# Patient Record
Sex: FEMALE | Race: WHITE | NOT HISPANIC OR LATINO | URBAN - METROPOLITAN AREA
[De-identification: names, ages, dates, MRNs, and addresses within clinical notes are randomized per-mention and may not be internally consistent; named-entity substitution may affect disease eponyms.]

---

## 2017-08-19 ENCOUNTER — EMERGENCY (EMERGENCY)
Facility: HOSPITAL | Age: 47
LOS: 1 days | Discharge: HOME | End: 2017-08-19
Attending: EMERGENCY MEDICINE | Admitting: EMERGENCY MEDICINE
Payer: COMMERCIAL

## 2017-08-19 VITALS
HEIGHT: 64 IN | SYSTOLIC BLOOD PRESSURE: 114 MMHG | WEIGHT: 130.07 LBS | OXYGEN SATURATION: 99 % | HEART RATE: 67 BPM | RESPIRATION RATE: 20 BRPM | DIASTOLIC BLOOD PRESSURE: 64 MMHG | TEMPERATURE: 98 F

## 2017-08-19 DIAGNOSIS — S01.81XA LACERATION WITHOUT FOREIGN BODY OF OTHER PART OF HEAD, INITIAL ENCOUNTER: ICD-10-CM

## 2017-08-19 DIAGNOSIS — Z23 ENCOUNTER FOR IMMUNIZATION: ICD-10-CM

## 2017-08-19 DIAGNOSIS — Z79.899 OTHER LONG TERM (CURRENT) DRUG THERAPY: ICD-10-CM
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
## 2017-08-19 PROCEDURE — 90471 IMMUNIZATION ADMIN: CPT

## 2017-08-19 PROCEDURE — 90715 TDAP VACCINE 7 YRS/> IM: CPT

## 2017-08-19 PROCEDURE — 12011 RPR F/E/E/N/L/M 2.5 CM/<: CPT

## 2017-08-19 PROCEDURE — 99283 EMERGENCY DEPT VISIT LOW MDM: CPT | Mod: 25

## 2017-08-19 RX ORDER — TETANUS TOXOID, REDUCED DIPHTHERIA TOXOID AND ACELLULAR PERTUSSIS VACCINE, ADSORBED 5; 2.5; 8; 8; 2.5 [IU]/.5ML; [IU]/.5ML; UG/.5ML; UG/.5ML; UG/.5ML
0.5 SUSPENSION INTRAMUSCULAR ONCE
Qty: 0 | Refills: 0 | Status: COMPLETED | OUTPATIENT
Start: 2017-08-19 | End: 2017-08-19

## 2017-08-19 RX ADMIN — TETANUS TOXOID, REDUCED DIPHTHERIA TOXOID AND ACELLULAR PERTUSSIS VACCINE, ADSORBED 0.5 MILLILITER(S): 5; 2.5; 8; 8; 2.5 SUSPENSION INTRAMUSCULAR at 19:02

## 2017-08-19 NOTE — ED ADULT NURSE NOTE - CHPI ED SYMPTOMS NEG
no purulent drainage/no redness/no bleeding/no bleeding at site/no fever/no red streaks/no drainage/no chills/no vomiting

## 2017-08-19 NOTE — ED PROVIDER NOTE - MEDICAL DECISION MAKING DETAILS
here w/ forehead laceration, mild. glued shut. tdap updated. no concern for more serious head injury. DC home in NAD with strict return precautions given.

## 2017-11-02 RX ORDER — ACETAZOLAMIDE 250 MG/1
0 TABLET ORAL
Qty: 0 | Refills: 0 | COMMUNITY

## 2017-11-02 RX ORDER — DIAZEPAM 5 MG
0 TABLET ORAL
Qty: 0 | Refills: 0 | COMMUNITY

## 2018-11-02 NOTE — ED ADULT NURSE NOTE - OBJECTIVE STATEMENT
pt states she was trying to get something from high up and a can fell on her head. Pt denies LOC and blood thinners.
No

## 2021-03-06 ENCOUNTER — EMERGENCY (EMERGENCY)
Facility: HOSPITAL | Age: 51
LOS: 1 days | Discharge: HOME | End: 2021-03-06
Admitting: EMERGENCY MEDICINE
Payer: COMMERCIAL

## 2021-03-06 VITALS
HEIGHT: 64 IN | HEART RATE: 80 BPM | SYSTOLIC BLOOD PRESSURE: 124 MMHG | TEMPERATURE: 98 F | DIASTOLIC BLOOD PRESSURE: 85 MMHG | RESPIRATION RATE: 16 BRPM | OXYGEN SATURATION: 98 %

## 2021-03-06 DIAGNOSIS — Z20.822 CONTACT WITH AND (SUSPECTED) EXPOSURE TO COVID-19: ICD-10-CM

## 2021-03-06 DIAGNOSIS — R51.9 HEADACHE, UNSPECIFIED: ICD-10-CM

## 2021-03-06 PROBLEM — H81.09 MENIERE'S DISEASE, UNSPECIFIED EAR: Chronic | Status: ACTIVE | Noted: 2017-08-19

## 2021-03-06 LAB — SARS-COV-2 RNA SPEC QL NAA+PROBE: SIGNIFICANT CHANGE UP

## 2021-03-06 PROCEDURE — 99283 EMERGENCY DEPT VISIT LOW MDM: CPT

## 2021-03-06 PROCEDURE — U0005: CPT

## 2021-03-06 PROCEDURE — 99284 EMERGENCY DEPT VISIT MOD MDM: CPT

## 2021-03-06 PROCEDURE — U0003: CPT

## 2021-03-06 NOTE — ED PROVIDER NOTE - PATIENT PORTAL LINK FT
You can access the FollowMyHealth Patient Portal offered by [facility] by registering at the following website: [email or web address]. By joining [employer]’s FollowMyHealth portal, you will also be able to view your health information using other applications (apps) compatible with our system.

## 2021-03-06 NOTE — ED PROVIDER NOTE - OBJECTIVE STATEMENT
Patient is presenting to the Emergency Department with a request to have COVID-19 testing. Pt reports headache x2d.  Denies symptoms, including cough, shortness of breath, fever, chills, bodyaches or sore throat.

## 2021-03-06 NOTE — ED PROVIDER NOTE - CARE PLAN
Principal Discharge DX:	Headache  Secondary Diagnosis:	Encounter for medical screening examination

## 2023-06-22 ENCOUNTER — APPOINTMENT (OUTPATIENT)
Dept: OTOLARYNGOLOGY | Facility: CLINIC | Age: 53
End: 2023-06-22

## 2024-04-29 ENCOUNTER — NON-APPOINTMENT (OUTPATIENT)
Age: 54
End: 2024-04-29

## 2024-04-29 ENCOUNTER — EMERGENCY (EMERGENCY)
Facility: HOSPITAL | Age: 54
LOS: 1 days | Discharge: HOME | End: 2024-04-29
Attending: EMERGENCY MEDICINE | Admitting: EMERGENCY MEDICINE
Payer: COMMERCIAL

## 2024-04-29 ENCOUNTER — APPOINTMENT (OUTPATIENT)
Dept: UROLOGY | Facility: CLINIC | Age: 54
End: 2024-04-29
Payer: COMMERCIAL

## 2024-04-29 VITALS
HEART RATE: 83 BPM | SYSTOLIC BLOOD PRESSURE: 107 MMHG | TEMPERATURE: 98 F | RESPIRATION RATE: 18 BRPM | DIASTOLIC BLOOD PRESSURE: 70 MMHG | OXYGEN SATURATION: 95 %

## 2024-04-29 VITALS
TEMPERATURE: 98 F | OXYGEN SATURATION: 97 % | RESPIRATION RATE: 18 BRPM | DIASTOLIC BLOOD PRESSURE: 72 MMHG | HEART RATE: 73 BPM | SYSTOLIC BLOOD PRESSURE: 116 MMHG

## 2024-04-29 VITALS
OXYGEN SATURATION: 96 % | BODY MASS INDEX: 21.68 KG/M2 | DIASTOLIC BLOOD PRESSURE: 80 MMHG | HEART RATE: 80 BPM | WEIGHT: 127 LBS | TEMPERATURE: 98.6 F | HEIGHT: 64 IN | SYSTOLIC BLOOD PRESSURE: 118 MMHG

## 2024-04-29 DIAGNOSIS — R10.9 UNSPECIFIED ABDOMINAL PAIN: ICD-10-CM

## 2024-04-29 LAB
ALBUMIN SERPL ELPH-MCNC: 4.5 G/DL — SIGNIFICANT CHANGE UP (ref 3.3–5)
ALP SERPL-CCNC: 70 U/L — SIGNIFICANT CHANGE UP (ref 40–120)
ALT FLD-CCNC: 34 U/L — SIGNIFICANT CHANGE UP (ref 10–45)
ANION GAP SERPL CALC-SCNC: 13 MMOL/L — SIGNIFICANT CHANGE UP (ref 5–17)
APPEARANCE UR: CLEAR — SIGNIFICANT CHANGE UP
AST SERPL-CCNC: 51 U/L — HIGH (ref 10–40)
BACTERIA # UR AUTO: NEGATIVE /HPF — SIGNIFICANT CHANGE UP
BASOPHILS # BLD AUTO: 0.03 K/UL — SIGNIFICANT CHANGE UP (ref 0–0.2)
BASOPHILS NFR BLD AUTO: 0.3 % — SIGNIFICANT CHANGE UP (ref 0–2)
BILIRUB SERPL-MCNC: 0.5 MG/DL — SIGNIFICANT CHANGE UP (ref 0.2–1.2)
BILIRUB UR-MCNC: NEGATIVE — SIGNIFICANT CHANGE UP
BUN SERPL-MCNC: 10 MG/DL — SIGNIFICANT CHANGE UP (ref 7–23)
CALCIUM SERPL-MCNC: 9.3 MG/DL — SIGNIFICANT CHANGE UP (ref 8.4–10.5)
CAST: 0 /LPF — SIGNIFICANT CHANGE UP (ref 0–4)
CHLORIDE SERPL-SCNC: 106 MMOL/L — SIGNIFICANT CHANGE UP (ref 96–108)
CO2 SERPL-SCNC: 24 MMOL/L — SIGNIFICANT CHANGE UP (ref 22–31)
COLOR SPEC: SIGNIFICANT CHANGE UP
CREAT SERPL-MCNC: 0.84 MG/DL — SIGNIFICANT CHANGE UP (ref 0.5–1.3)
DIFF PNL FLD: ABNORMAL
EGFR: 83 ML/MIN/1.73M2 — SIGNIFICANT CHANGE UP
EOSINOPHIL # BLD AUTO: 0.06 K/UL — SIGNIFICANT CHANGE UP (ref 0–0.5)
EOSINOPHIL NFR BLD AUTO: 0.6 % — SIGNIFICANT CHANGE UP (ref 0–6)
GLUCOSE SERPL-MCNC: 96 MG/DL — SIGNIFICANT CHANGE UP (ref 70–99)
GLUCOSE UR QL: NEGATIVE MG/DL — SIGNIFICANT CHANGE UP
HCT VFR BLD CALC: 40.7 % — SIGNIFICANT CHANGE UP (ref 34.5–45)
HGB BLD-MCNC: 13.8 G/DL — SIGNIFICANT CHANGE UP (ref 11.5–15.5)
IMM GRANULOCYTES NFR BLD AUTO: 0.2 % — SIGNIFICANT CHANGE UP (ref 0–0.9)
KETONES UR-MCNC: NEGATIVE MG/DL — SIGNIFICANT CHANGE UP
LEUKOCYTE ESTERASE UR-ACNC: NEGATIVE — SIGNIFICANT CHANGE UP
LYMPHOCYTES # BLD AUTO: 1.48 K/UL — SIGNIFICANT CHANGE UP (ref 1–3.3)
LYMPHOCYTES # BLD AUTO: 16 % — SIGNIFICANT CHANGE UP (ref 13–44)
MCHC RBC-ENTMCNC: 30.3 PG — SIGNIFICANT CHANGE UP (ref 27–34)
MCHC RBC-ENTMCNC: 33.9 GM/DL — SIGNIFICANT CHANGE UP (ref 32–36)
MCV RBC AUTO: 89.5 FL — SIGNIFICANT CHANGE UP (ref 80–100)
MONOCYTES # BLD AUTO: 0.64 K/UL — SIGNIFICANT CHANGE UP (ref 0–0.9)
MONOCYTES NFR BLD AUTO: 6.9 % — SIGNIFICANT CHANGE UP (ref 2–14)
NEUTROPHILS # BLD AUTO: 7.02 K/UL — SIGNIFICANT CHANGE UP (ref 1.8–7.4)
NEUTROPHILS NFR BLD AUTO: 76 % — SIGNIFICANT CHANGE UP (ref 43–77)
NITRITE UR-MCNC: POSITIVE
NRBC # BLD: 0 /100 WBCS — SIGNIFICANT CHANGE UP (ref 0–0)
PH UR: 6.5 — SIGNIFICANT CHANGE UP (ref 5–8)
PLATELET # BLD AUTO: 249 K/UL — SIGNIFICANT CHANGE UP (ref 150–400)
POTASSIUM SERPL-MCNC: 3.5 MMOL/L — SIGNIFICANT CHANGE UP (ref 3.5–5.3)
POTASSIUM SERPL-SCNC: 3.5 MMOL/L — SIGNIFICANT CHANGE UP (ref 3.5–5.3)
PROT SERPL-MCNC: 6.9 G/DL — SIGNIFICANT CHANGE UP (ref 6–8.3)
PROT UR-MCNC: NEGATIVE MG/DL — SIGNIFICANT CHANGE UP
RBC # BLD: 4.55 M/UL — SIGNIFICANT CHANGE UP (ref 3.8–5.2)
RBC # FLD: 12.7 % — SIGNIFICANT CHANGE UP (ref 10.3–14.5)
RBC CASTS # UR COMP ASSIST: 0 /HPF — SIGNIFICANT CHANGE UP (ref 0–4)
SODIUM SERPL-SCNC: 143 MMOL/L — SIGNIFICANT CHANGE UP (ref 135–145)
SP GR SPEC: <1.005 — LOW (ref 1–1.03)
SQUAMOUS # UR AUTO: 2 /HPF — SIGNIFICANT CHANGE UP (ref 0–5)
UROBILINOGEN FLD QL: 1 MG/DL — SIGNIFICANT CHANGE UP (ref 0.2–1)
WBC # BLD: 9.25 K/UL — SIGNIFICANT CHANGE UP (ref 3.8–10.5)
WBC # FLD AUTO: 9.25 K/UL — SIGNIFICANT CHANGE UP (ref 3.8–10.5)
WBC UR QL: 1 /HPF — SIGNIFICANT CHANGE UP (ref 0–5)

## 2024-04-29 PROCEDURE — 36415 COLL VENOUS BLD VENIPUNCTURE: CPT

## 2024-04-29 PROCEDURE — 99284 EMERGENCY DEPT VISIT MOD MDM: CPT

## 2024-04-29 PROCEDURE — 99203 OFFICE O/P NEW LOW 30 MIN: CPT

## 2024-04-29 PROCEDURE — 74176 CT ABD & PELVIS W/O CONTRAST: CPT | Mod: 26,MC

## 2024-04-29 PROCEDURE — 81001 URINALYSIS AUTO W/SCOPE: CPT

## 2024-04-29 PROCEDURE — 96374 THER/PROPH/DIAG INJ IV PUSH: CPT

## 2024-04-29 PROCEDURE — 85025 COMPLETE CBC W/AUTO DIFF WBC: CPT

## 2024-04-29 PROCEDURE — 80053 COMPREHEN METABOLIC PANEL: CPT

## 2024-04-29 PROCEDURE — 74176 CT ABD & PELVIS W/O CONTRAST: CPT | Mod: MC

## 2024-04-29 PROCEDURE — 99284 EMERGENCY DEPT VISIT MOD MDM: CPT | Mod: 25

## 2024-04-29 RX ORDER — SODIUM CHLORIDE 9 MG/ML
1000 INJECTION INTRAMUSCULAR; INTRAVENOUS; SUBCUTANEOUS ONCE
Refills: 0 | Status: COMPLETED | OUTPATIENT
Start: 2024-04-29 | End: 2024-04-29

## 2024-04-29 RX ORDER — MAGNESIUM OXIDE/MAG AA CHELATE 300 MG
CAPSULE ORAL
Refills: 0 | Status: ACTIVE | COMMUNITY

## 2024-04-29 RX ORDER — OXYCODONE AND ACETAMINOPHEN 5; 325 MG/1; MG/1
1 TABLET ORAL
Qty: 12 | Refills: 0
Start: 2024-04-29

## 2024-04-29 RX ORDER — MORPHINE SULFATE 50 MG/1
4 CAPSULE, EXTENDED RELEASE ORAL ONCE
Refills: 0 | Status: DISCONTINUED | OUTPATIENT
Start: 2024-04-29 | End: 2024-04-29

## 2024-04-29 RX ADMIN — MORPHINE SULFATE 4 MILLIGRAM(S): 50 CAPSULE, EXTENDED RELEASE ORAL at 14:07

## 2024-04-29 RX ADMIN — SODIUM CHLORIDE 1000 MILLILITER(S): 9 INJECTION INTRAMUSCULAR; INTRAVENOUS; SUBCUTANEOUS at 13:37

## 2024-04-29 RX ADMIN — MORPHINE SULFATE 4 MILLIGRAM(S): 50 CAPSULE, EXTENDED RELEASE ORAL at 13:37

## 2024-04-29 NOTE — ASSESSMENT
[FreeTextEntry1] : Assessment:   DOTTY EVERETT is a 53 year old female with severe left flank pain and microscopic hematuria, suspicious for passing a kidney stone.  We discussed recent blood work and ultrasounds, which do not confirm she is passing a stone, and indication for further evaluation with CT scan to either confirm an obstructing stone or identify other causes of pain.  If an obstructing ureteral stone is noted, I discussed the management of urolithiasis with the patient including watchful waiting +/- medical expulsive therapy, shock wave lithotripsy, and ureteroscopy with laser lithotripsy. We also discussed possibility of placing a ureteral stent to help with pain management. Reviewed potential stent related symptoms.  Given her poorly controlled pain and motivation to have an accurate diagnosis and possible intervention as soon as possible, I directed her to the [facility] ED for further evaluation and possible intervention.     Plan:   -Proceed to [facility] ED

## 2024-04-29 NOTE — ED ADULT NURSE NOTE - OBJECTIVE STATEMENT
Pt is a 54y/o F presenting to the ED w/ c/o of bilat/lower pelvic pain, abd tenderness/abd distention, radiating to flank bilaterally, since Friday, seen @ [facility], refused CT scan d/t radiation, bilat kidney stones seen on US, seen @ [facility] urology today, sent to ED for pain control/CT scan. Pt denies PMHx, last took Aleve for pain today @11am w/ no relief. Pt denies CP, SOB, fever/chills, N/V/D, HA, blurry vision, dizziness/lightheadedness, numbness/tingling, nasal congestion, cough, sore throat, urinary symptoms, recent falls @ home. Pt taking Azo @ home for UTI symptoms, but denies urinary frequency, dysuria, hematuria, vaginal discharge. Pt A/Ox3, speaking in clear/complete sentences. Respirations easy/even and unlabored on RA. Pt ambulates independently w/ steady gait. Pt w/ c/o of worsening pain, anxious appearing.

## 2024-04-29 NOTE — ED PROVIDER NOTE - CONSTITUTIONAL, MLM
Well appearing, awake, alert, oriented to person, place, time/situation and appears uncomfortable normal...

## 2024-04-29 NOTE — ED ADULT NURSE NOTE - CHIEF COMPLAINT QUOTE
Pt presents to ED C/O L flank pain starting Friday AM seen at [place] on Sat. Pt states, " I had two US they said I have a kidney stone in both sides but the L is hurting, they said it should be passable but wanted a CT to see if it's moving, I just came from urology upstairs." Denies PMH. Pt took Aleve PTA. Pt has US report at bedside.

## 2024-04-29 NOTE — ED PROVIDER NOTE - CLINICAL SUMMARY MEDICAL DECISION MAKING FREE TEXT BOX
flank pain on left r/o obstructing stone. already on aleve/keflex. labs sent. given morphine iv. ct ordered. flank pain on left r/o obstructing stone. already on aleve/keflex. labs sent. given morphine iv. ct ordered.  ct with punctate uvj stone, mild hydro. renal function normal. ua w nitrite (already on keflex).  pain controlled after morphine. will dc w percocet. continue current meds. f/u urology. return precautions discussed

## 2024-04-29 NOTE — ED ADULT TRIAGE NOTE - CHIEF COMPLAINT QUOTE
Pt presents to ED C/O L flank pain starting Friday AM seen at [facility] on Sat. Pt states, " I had two US they said I have a kidney stone in both sides but the L is hurting, they said it should be passable but wanted a CT to see if it's moving, I just came from urology upstairs." Denies PMH. Pt took Aleve PTA. Pt has US report at bedside.

## 2024-04-29 NOTE — HISTORY OF PRESENT ILLNESS
[FreeTextEntry1] : Name DOTTY EVERETT MRN [id number]  1970 ------------------------------------------------------------------------------------------------------------------------------------------- Date of First Visit:  2024 Referring Provider/PCP: Dr. Diogo Mcclure   ------------------------------------------------------------------------------------------------------------------------------------------- CC: left flank pain, possible kidney stone  History of Present Illness: DOTTY EVERETT is a 53-year-old female with no significant PMH who presents for evaluation of left flank pain and possible kidney stone. Started experiencing left flank and abdominal pain on Friday night, which persisted to Saturday morning. Pain became severe so she presented to an outside ER. Renal US (report only) showed right upper pole stone measuring 9mm, left upper pole stone measuring 6mm, no hydronephrosis.  Pelvis US showed a fibroid uterus. UA showed numerous RBCs, negative nitrites and leukocytes. Creatinine 0.77, WBC 6.3. She was discharged with Keflex, Pyridium, Flomax and was presumed to be passing a kidney stone. She presents today to establish care. Has been experiencing persistent left flank and abdominal pain, poorly controlled with Advil and Aleve. Some associated nausea.  Denies fever, chills, vomiting, urinary complaints.     Renal US (report only) scanned into chart: Right upper pole stone measuring 9mm, left upper pole stone measuring 6mm, no hydronephrosis.   Previous urine cultures: None    Kidney Stone History:  First-time stone former - Yes Concurrent asymptomatic stone(s) - Yes Previous stone surgeries - No Previous passed stones - No  Comorbidities - non-contributory Family history of kidney stones - No   Previous metabolic evaluation - No

## 2024-04-29 NOTE — ED PROVIDER NOTE - NSFOLLOWUPINSTRUCTIONS_ED_ALL_ED_FT
Continue your previously prescribed medications along with ibuprofen or aleve as directed for pain. For severe pain, take percocet in addition. Please see your urologist for followup.  Call for appointment.  If you have any problems with followup, please call the ED Referral Coordinator at [phone].  Return to the ER if symptoms worsen or other concerns.    Do not take narcotics (percocet) and drive or operate machinery      Kidney Stones    WHAT YOU NEED TO KNOW:    Kidney stones form in the urinary system when the water and waste in your urine are out of balance. When this happens, certain types of waste crystals separate from the urine. The crystals build up and form kidney stones. You may have more than one kidney stone.     Kidney Stones          DISCHARGE INSTRUCTIONS:    Return to the emergency department if:   •You have vomiting that is not relieved by medicine.          Contact your healthcare provider if:   •You have a fever.       •You have trouble passing urine.      •You see blood in your urine.      •You have severe pain.      •You have any questions or concerns about your condition or care.      Medicines:   •NSAIDs, such as ibuprofen, help decrease swelling, pain, and fever. This medicine is available with or without a doctor's order. NSAIDs can cause stomach bleeding or kidney problems in certain people. If you take blood thinner medicine, always ask your healthcare provider if NSAIDs are safe for you. Always read the medicine label and follow directions.      •Prescription pain medicine may be given. Ask your healthcare provider how to take this medicine safely. Some prescription pain medicines contain acetaminophen. Do not take other medicines that contain acetaminophen without talking to your healthcare provider. Too much acetaminophen may cause liver damage. Prescription pain medicine may cause constipation. Ask your healthcare provider how to prevent or treat constipation.       •Medicines to balance your electrolytes may be needed.       •Take your medicine as directed. Contact your healthcare provider if you think your medicine is not helping or if you have side effects. Tell him or her if you are allergic to any medicine. Keep a list of the medicines, vitamins, and herbs you take. Include the amounts, and when and why you take them. Bring the list or the pill bottles to follow-up visits. Carry your medicine list with you in case of an emergency.      Follow up with your healthcare provider as directed: You may need to return for more tests. Write down your questions so you remember to ask them during your visits.    What you can do to manage kidney stones:   •Drink more liquids. Your healthcare provider may tell you to drink at least 8 to 12 (eight-ounce) cups of liquids each day. This helps flush out the kidney stones when you urinate. Water is the best liquid to drink.      •Strain your urine every time you go to the bathroom. Urinate through a strainer or a piece of thin cloth to catch the stones. Take the stones to your healthcare provider so they can be sent to the lab for tests. This will help your healthcare providers plan the best treatment for you.  Look for Stones in the Filter           •Eat a variety of healthy foods. Healthy foods include fruits, vegetables, whole-grain breads, low-fat dairy products, beans, and fish. You may need to limit how much sodium (salt) or protein you eat. Ask for information about the best foods for you.      •Stay active. Your stones may pass more easily if you stay active. Exercise can also help you manage your weight. Ask about the best activities for you.      After you pass the kidney stones: Your healthcare provider may order a 24-hour urine test. Results from a 24-hour urine test will help your healthcare provider plan ways to prevent more stones from forming. Your healthcare provider will give you more instructions.

## 2024-04-29 NOTE — ED PROVIDER NOTE - CARE PROVIDER_API CALL
Dax Santiago.  Urology  [address]  [place] [address]  Phone: [phone]  Fax: [phone]  Follow Up Time:

## 2024-04-29 NOTE — ED PROVIDER NOTE - OBJECTIVE STATEMENT
history of kidney stones, here with left flank pain since Friday. Went to another ER, had US done, told non obstructing stones bilaterally. Prescribed keflex, phenazopyridine, flomax, which she has been taking along with aleve/ibuprofen. Today pain worsened. Went to [facility] and referred to ED for ct. Denies vomiting, fever, hematuria.

## 2024-05-02 DIAGNOSIS — Z87.442 PERSONAL HISTORY OF URINARY CALCULI: ICD-10-CM

## 2024-05-02 DIAGNOSIS — N13.2 HYDRONEPHROSIS WITH RENAL AND URETERAL CALCULOUS OBSTRUCTION: ICD-10-CM

## 2024-05-02 DIAGNOSIS — R10.9 UNSPECIFIED ABDOMINAL PAIN: ICD-10-CM

## 2024-05-08 ENCOUNTER — APPOINTMENT (OUTPATIENT)
Dept: UROLOGY | Facility: CLINIC | Age: 54
End: 2024-05-08

## 2024-06-06 ENCOUNTER — APPOINTMENT (OUTPATIENT)
Dept: UROLOGY | Facility: CLINIC | Age: 54
End: 2024-06-06
Payer: COMMERCIAL

## 2024-06-06 PROCEDURE — 76770 US EXAM ABDO BACK WALL COMP: CPT

## 2024-06-06 PROCEDURE — 99213 OFFICE O/P EST LOW 20 MIN: CPT

## 2024-06-07 NOTE — HISTORY OF PRESENT ILLNESS
[FreeTextEntry1] : Name DOTTY EVERETT MRN [id number]  1970 ------------------------------------------------------------------------------------------------------------------------------------------- Date of First Visit: 2024 Referring Provider/PCP: Dr. Diogo Mcclure ------------------------------------------------------------------------------------------------------------------------------------------- CC: left flank pain, possible kidney stone  History of Present Illness: DOTTY EVERETT is a 53-year-old female with no significant PMH who presents for evaluation of left flank pain and possible kidney stone. Started experiencing left flank and abdominal pain on Friday night, which persisted to Saturday morning. Pain became severe so she presented to an outside ER. Renal US (report only) showed right upper pole stone measuring 9mm, left upper pole stone measuring 6mm, no hydronephrosis. Pelvis US showed a fibroid uterus. UA showed numerous RBCs, negative nitrites and leukocytes. Creatinine 0.77, WBC 6.3. She was discharged with Keflex, Pyridium, Flomax and was presumed to be passing a kidney stone. She presents today to establish care. Has been experiencing persistent left flank and abdominal pain, poorly controlled with Advil and Aleve. Some associated nausea. Denies fever, chills, vomiting, urinary complaints.  Renal US (report only) scanned into chart: Right upper pole stone measuring 9mm, left upper pole stone measuring 6mm, no hydronephrosis.  Previous urine cultures: None  Kidney Stone History: First-time stone former - Yes Concurrent asymptomatic stone(s) - Yes Previous stone surgeries - No Previous passed stones - No Comorbidities - non-contributory Family history of kidney stones - No Previous metabolic evaluation - No ------------------------------------------------------------------------------------------------------------------------------------------- Interval History (2024): Interval passage of ureteral stone based on ultrasound today.  Specifically, no evidence of hydronephrosis or stone at the left UVJ, where prior punctate stone had been located. Stable right upper pole stone (7 mm on ultrasound).

## 2024-06-07 NOTE — ASSESSMENT
[FreeTextEntry1] : Assessment:   DOTTY EVERETT is a 54 y/o F with a recently passed left UVJ stone with a nonobstructing right upper pole stone.  We discussed generalized stone prevention strategies, metabolic evaluation (serum chemistry profile and 24-hour urine collection +/- PTH testing if serum Ca is elevated), and the natural history of kidney stone disease. I explained that first-time stone formers generally do not need a complete metabolic work-up in the absence of risk factors. However, without behavioral and dietary modification, they are at a heightened risk of developing future symptomatic stones: 10% at 2 years, 20% at 5 years, and 30% at 10 years ([facility] data). In recurrent stone formers, the risk of recurrence is considerably higher with a lifetime recurrence rate of 60-80%. Risk factors include stone type, total stone volume, family history, multiple stones, and many medical comorbidities (DM, HTN, HLD, obesity, gout, HPT).      Generalized stone prevention counseling was provided as follows:   1. High fluid intake. The goal should be to drink enough to produce 2.5 liters (quarts) of urine daily. Most studies have shown that high fluid volume intake will decrease the risk of stone formation. Research generally indicates that there appears to be little difference between hard and soft water in the formation of kidney stones. Lemon juice added to the water may also aid with increasing urine pH, urine citric acid and reducing stone formation.      2. Dietary recommendations. The key to all dietary recommendations is moderation.    Decrease animal protein consumption. High protein intake increases urinary calcium, oxalate, and uric acid excretion into the urine - all of which will increase the probability of stone formation.    Decrease sodium intake by avoiding heavily salted foods, and resist adding salt to food at the table. Sodium restriction is widely recognized as an important element of dietary prevention of recurrent kidney stones.    Dietary calcium. Patient should consume a daily recommended allowance of dietary calcium (800-1200 mg). Patients who take in too much Ca or too little Ca are at an increased risk of recurrent stone formation. Dietary calcium is preferable to supplements. Calcium supplementation can be safe when the calcium is taken with meals, rather than at bedtime. Another suggestion for patients who have been recommended to take calcium supplements for their bone health is to consider using calcium citrate, an over-the-counter preparation that provides 950 mg of calcium citrate and 200 mg of elemental calcium in each tablet.    Avoid excess intake of foods with high oxalate content, including dark leafy greens, beets, nuts, tea, coffee, and chocolate. Strict avoidance of oxalate is not necessary in most cases.    Avoid high doses of vitamin C. Intake should be limited to a maximum daily dose of less than 2 gm (2,000 mg).       Plan:   -Follow up visit in 6 months with renal ultrasound   -[facility] with [facility] to review results

## 2024-07-08 ENCOUNTER — APPOINTMENT (OUTPATIENT)
Dept: UROLOGY | Facility: CLINIC | Age: 54
End: 2024-07-08

## 2024-12-17 NOTE — ED PROVIDER NOTE - OBJECTIVE STATEMENT
----- Message from Tyrese Vann sent at 12/17/2024  6:29 AM EST -----  Her alk phos is much higher.  We will make sure she gets her ultrasound now.  We will also try to add alkaline phosphatase isoenzymes to her most recent labs.   48yo F 46yo F hx of menieres dx, here for head injury. Was reaching in kitchen cabinets that are quite high, and accidently knocked a can down which hit her on the forehead. No LOC, no N/V. +mild swelling and laceration to the forehead so came in. Cleaned with peroxide and covered w/ neosporin. Unsure of last tdap.

## 2025-01-08 ENCOUNTER — APPOINTMENT (OUTPATIENT)
Dept: UROLOGY | Facility: CLINIC | Age: 55
End: 2025-01-08

## 2025-01-30 ENCOUNTER — RX ONLY (RX ONLY)
Age: 55
End: 2025-01-30

## 2025-01-30 ENCOUNTER — OFFICE (OUTPATIENT)
Dept: URBAN - METROPOLITAN AREA CLINIC 28 | Facility: CLINIC | Age: 55
Setting detail: OPHTHALMOLOGY
End: 2025-01-30
Payer: COMMERCIAL

## 2025-01-30 DIAGNOSIS — H11.151: ICD-10-CM

## 2025-01-30 DIAGNOSIS — H16.223: ICD-10-CM

## 2025-01-30 PROCEDURE — 92004 COMPRE OPH EXAM NEW PT 1/>: CPT | Performed by: OPHTHALMOLOGY

## 2025-01-30 ASSESSMENT — REFRACTION_AUTOREFRACTION
OS_AXIS: 036
OS_SPHERE: +0.75
OS_CYLINDER: -0.25
OD_AXIS: 158
OD_SPHERE: +0.50
OD_CYLINDER: -0.25

## 2025-01-30 ASSESSMENT — VISUAL ACUITY
OS_BCVA: 20/20-1
OD_BCVA: 20/20

## 2025-01-30 ASSESSMENT — KERATOMETRY
OS_K2POWER_DIOPTERS: 43.50
OD_K2POWER_DIOPTERS: 43.75
OD_K1POWER_DIOPTERS: 42.25
OS_AXISANGLE_DEGREES: 085
OD_AXISANGLE_DEGREES: 092
OS_K1POWER_DIOPTERS: 42.50

## 2025-01-30 ASSESSMENT — CONFRONTATIONAL VISUAL FIELD TEST (CVF)
OD_FINDINGS: FULL
OS_FINDINGS: FULL

## 2025-01-30 ASSESSMENT — TEAR BREAK UP TIME (TBUT)
OS_TBUT: 1+
OD_TBUT: 1+

## 2025-01-30 ASSESSMENT — TONOMETRY
OS_IOP_MMHG: 18
OD_IOP_MMHG: 16

## 2025-05-04 ENCOUNTER — NON-APPOINTMENT (OUTPATIENT)
Age: 55
End: 2025-05-04

## 2025-05-05 ENCOUNTER — NON-APPOINTMENT (OUTPATIENT)
Age: 55
End: 2025-05-05

## 2025-05-06 ENCOUNTER — OUTPATIENT (OUTPATIENT)
Dept: OUTPATIENT SERVICES | Facility: HOSPITAL | Age: 55
LOS: 1 days | End: 2025-05-06
Payer: COMMERCIAL

## 2025-05-06 ENCOUNTER — APPOINTMENT (OUTPATIENT)
Dept: ULTRASOUND IMAGING | Facility: HOSPITAL | Age: 55
End: 2025-05-06

## 2025-05-06 PROCEDURE — 76770 US EXAM ABDO BACK WALL COMP: CPT | Mod: 26

## 2025-05-06 PROCEDURE — 76770 US EXAM ABDO BACK WALL COMP: CPT

## 2025-05-07 ENCOUNTER — NON-APPOINTMENT (OUTPATIENT)
Age: 55
End: 2025-05-07

## 2025-05-07 DIAGNOSIS — N20.0 CALCULUS OF KIDNEY: ICD-10-CM

## 2025-05-09 ENCOUNTER — APPOINTMENT (OUTPATIENT)
Dept: UROLOGY | Facility: CLINIC | Age: 55
End: 2025-05-09